# Patient Record
Sex: MALE | Race: WHITE | NOT HISPANIC OR LATINO | Employment: FULL TIME | URBAN - METROPOLITAN AREA
[De-identification: names, ages, dates, MRNs, and addresses within clinical notes are randomized per-mention and may not be internally consistent; named-entity substitution may affect disease eponyms.]

---

## 2021-05-27 ENCOUNTER — RECORDS - HEALTHEAST (OUTPATIENT)
Dept: ADMINISTRATIVE | Facility: CLINIC | Age: 31
End: 2021-05-27

## 2021-05-30 ENCOUNTER — RECORDS - HEALTHEAST (OUTPATIENT)
Dept: ADMINISTRATIVE | Facility: CLINIC | Age: 31
End: 2021-05-30

## 2021-05-31 ENCOUNTER — RECORDS - HEALTHEAST (OUTPATIENT)
Dept: ADMINISTRATIVE | Facility: CLINIC | Age: 31
End: 2021-05-31

## 2021-06-01 ENCOUNTER — RECORDS - HEALTHEAST (OUTPATIENT)
Dept: ADMINISTRATIVE | Facility: CLINIC | Age: 31
End: 2021-06-01

## 2021-06-02 ENCOUNTER — RECORDS - HEALTHEAST (OUTPATIENT)
Dept: ADMINISTRATIVE | Facility: CLINIC | Age: 31
End: 2021-06-02

## 2023-09-02 ENCOUNTER — HOSPITAL ENCOUNTER (EMERGENCY)
Facility: CLINIC | Age: 33
Discharge: HOME | End: 2023-09-02
Payer: COMMERCIAL

## 2023-09-02 VITALS
TEMPERATURE: 98.1 F | OXYGEN SATURATION: 100 % | HEART RATE: 82 BPM | RESPIRATION RATE: 18 BRPM | DIASTOLIC BLOOD PRESSURE: 75 MMHG | SYSTOLIC BLOOD PRESSURE: 126 MMHG

## 2023-09-02 DIAGNOSIS — Z76.0 ENCOUNTER FOR MEDICATION REFILL: ICD-10-CM

## 2023-09-02 PROCEDURE — 99283 EMERGENCY DEPT VISIT LOW MDM: CPT

## 2023-09-02 RX ORDER — LISDEXAMFETAMINE DIMESYLATE 60 MG/1
60 CAPSULE ORAL EVERY MORNING
Qty: 14 CAPSULE | Refills: 0 | Status: SHIPPED | OUTPATIENT
Start: 2023-09-02

## 2023-09-02 NOTE — DISCHARGE INSTRUCTIONS
Your prescription for Vyvanse was refilled today.  Take this as directed.  Please call your primary care provider to get back on schedule of having your Vyvanse prescribed.

## 2023-09-02 NOTE — ED PROVIDER NOTES
EMERGENCY DEPARTMENT ENCOUNTER      NAME: Kingston Domingo  AGE: 33 year old male  YOB: 1990  MRN: [id number]  EVALUATION DATE & TIME: No admission date for patient encounter.    PCP: No primary care provider on file.    ED PROVIDER: Brandy Wasserman PA-C    Chief Complaint   Patient presents with    Medication Refill     FINAL IMPRESSION:  1. Encounter for medication refill      MEDICAL DECISION MAKING:    Pertinent Labs & Imaging studies reviewed. (See chart for details)  Kingston Domingo is a 33 year old male who presents for evaluation of needing medication refilled.  Patient reports he takes Vyvanse 60 mg once a day and he has been taking this medication for 14 years.  Went to get his refill for his medication today at [facility] and he was unable to get it filled due to medication shortage at that location.  Prescription was not able to be transferred to another pharmacy.  He called his PCP but was not able to get in touch with them due to being a Saturday.  Went to an outside urgent care and was advised to present to the ER for further evaluation.  Denies any complaints currently.  Is leaving for a [place] trip [place] tomorrow.     Patient is clinically well-appearing and vitals are stable.  He has a normal physical exam today.  It is unfortunate that he was unable to have his medication filled as an outpatient, however I will refill his Vyvanse for the next 14 days to get him through his trip.  I did check his  and he is very reassuring historian, no sign of opioid use or drug-seeking behavior.  I encouraged him to call his PCP for refills going forward.  No complaints currently that would require further ER evaluation or work-up today.  All questions addressed prior to discharge.    Patient was discharged in stable condition with treatment plan as below. Instructed to follow up with primary care provider in 3 days and return to the emergency department with any new or worsening of  symptoms. Patient expressed understanding, feels comfortable, and is in agreement with this plan. All questions addressed prior to discharge.    Medical Decision Making    History:  Supplemental history from: Documented in chart, if applicable  External Record(s) reviewed: Documented in chart, if applicable.    Work Up:  Chart documentation includes differential considered and any EKGs or imaging independently interpreted by provider, where specified.  In additional to work up documented, I considered the following work up: Documented in chart, if applicable.    External consultation:  Discussion of management with another provider: Documented in chart, if applicable    Complicating factors:  Care impacted by chronic illness: N/A  Care affected by social determinants of health: N/A    Disposition considerations: Discharge. I prescribed additional prescription strength medication(s) as charted. See documentation for any additional details.    ED COURSE:  6:25 PM I reviewed the patient's chart. I met with the patient to gather history and to perform my initial exam.  We discussed plan for discharge including treatment plan, follow-up and return precautions to emergency department.  Patient voiced understanding and in agreement with this plan.    At the conclusion of the encounter I discussed the results of all of the tests and the disposition. The questions were answered. The patient or family acknowledged understanding and was agreeable with the care plan.     Voice recognition software was used in the creation of this note. Any grammatical or nonsensical errors are due to inherent errors with the software and are not the intention of the writer.     MEDICATIONS GIVEN IN THE EMERGENCY:  Medications - No data to display    NEW PRESCRIPTIONS STARTED AT TODAY'S ER VISIT  Discharge Medication List as of 9/2/2023  6:32 PM        START taking these medications    Details   lisdexamfetamine (VYVANSE) 60 MG capsule Take 1  capsule (60 mg) by mouth every morning, Disp-14 capsule, R-0, Local Print           =================================================================    HPI:    Patient information was obtained from: Patient    Use of Interpretor: N/A       Kingston Domingo is a 33 year old male with a pertinent history of attention deficit disorder who presents to this ED for evaluation of medication refill.    Patient had received a refill prescription for vyvanse from his primary care doctor as he is leaving out of the country soon. The pharmacy the script was sent to was out of stock and he had tried contacting his provider but was not able to reach them so he went to urgent care and was told to come here.  He takes Vyvanse 60 mg once a day and states he will be out of the country for 2 weeks.  He does not take any other daily medications.  Denies any current complaints.    REVIEW OF SYSTEMS:  Review of Systems   All other systems reviewed and are negative.       PAST MEDICAL HISTORY:  No past medical history on file.    PAST SURGICAL HISTORY:  No past surgical history on file.    CURRENT MEDICATIONS:    No current facility-administered medications for this encounter.    Current Outpatient Medications:     lisdexamfetamine (VYVANSE) 60 MG capsule, Take 1 capsule (60 mg) by mouth every morning, Disp: 14 capsule, Rfl: 0    sulindac (CLINORIL) 200 MG tablet, [SULINDAC (CLINORIL) 200 MG TABLET] Take 1 tablet (200 mg total) by mouth 2 (two) times a day. Take with food, Disp: 60 tablet, Rfl: 2    ALLERGIES:  No Known Allergies    FAMILY HISTORY:  No family history on file.    SOCIAL HISTORY:   Social History     Socioeconomic History    Marital status: Single   Tobacco Use    Smoking status: Never   Substance and Sexual Activity    Alcohol use: Yes     Comment: Alcoholic Drinks/day: Occasional       VITALS:  Patient Vitals for the past 24 hrs:   BP Temp Temp src Pulse Resp SpO2 Height Weight   09/02/23 1800 126/75 -- -- 82 18 100 % --  "--   09/02/23 1750 122/78 98.1  F (36.7  C) Oral 79 20 100 % -- --   09/02/23 1745 -- -- -- -- -- -- (P) 1.803 m (5' 11\") (P) 83.9 kg (185 lb)       PHYSICAL EXAM    Constitutional: Well developed, Well nourished, NAD  HENT: Normocephalic, Atraumatic, Bilateral external ears normal, Oropharynx normal, mucous membranes moist, Nose normal.   Neck: Normal range of motion, No tenderness, Supple, No stridor.  Eyes: PERRL, EOMI, Conjunctiva normal, No discharge.   Respiratory: Normal breath sounds, No respiratory distress, No wheezing, Speaks full sentences easily. No cough.  Cardiovascular: Normal heart rate, Regular rhythm, No murmurs, No rubs, No gallops. Chest wall nontender.  GI: Soft, No tenderness, No masses, No flank tenderness. No rebound or guarding.  Musculoskeletal: 2+ DP pulses. No edema. No cyanosis, No clubbing. Good range of motion in all major joints. No tenderness to palpation or major deformities noted. No tenderness of the CTLS spine.   Integument: Warm, Dry, No erythema, No rash. No petechiae.  Neurologic: Alert & oriented x 3, Normal motor function, Normal sensory function, No focal deficits noted. Normal gait.  Psychiatric: Affect normal, Judgment normal, Mood normal. Cooperative.    LAB:  All pertinent labs reviewed and interpreted.  Labs Ordered and Resulted from Time of ED Arrival to Time of ED Departure - No data to display    RADIOLOGY:  Reviewed all pertinent imaging. Please see official radiology report.  No orders to display     PROCEDURES:   None.    Diagnosis:  1. Encounter for medication refill      Adriana ROSA , am serving as a scribe to document services personally performed by Brandy Wasserman PA-C based on my observation and the provider's statements to me. Brandy ROSA PA-C attest that Adriana Lee  is acting in a scribe capacity, has observed my performance of the services and has documented them in accordance with my direction.    Brandy Wasserman, " TAJ  Emergency Medicine  [facility]  9/2/2023       Brandy Wasserman PA-C  09/02/23 2030

## 2023-09-02 NOTE — ED TRIAGE NOTES
Patient arrives to the ER with a need for a prescription refill of Vyvanse.  He received a script from his primary doctor, but the pharmacy that the prescription was sent to was out of stock.  The pharmacy stated that they could not transfer the script over to a pharmacy that had it available.       Triage Assessment       Row Name 09/02/23 1741       Triage Assessment (Adult)    Airway WDL WDL       Respiratory WDL    Respiratory WDL WDL       Skin Circulation/Temperature WDL    Skin Circulation/Temperature WDL WDL       Cardiac WDL    Cardiac WDL WDL       Peripheral/Neurovascular WDL    Peripheral Neurovascular WDL WDL       Cognitive/Neuro/Behavioral WDL    Cognitive/Neuro/Behavioral WDL WDL